# Patient Record
Sex: MALE | Race: BLACK OR AFRICAN AMERICAN
[De-identification: names, ages, dates, MRNs, and addresses within clinical notes are randomized per-mention and may not be internally consistent; named-entity substitution may affect disease eponyms.]

---

## 2022-11-17 ENCOUNTER — DASHBOARD ENCOUNTERS (OUTPATIENT)
Age: 72
End: 2022-11-17

## 2022-11-17 ENCOUNTER — OFFICE VISIT (OUTPATIENT)
Dept: URBAN - METROPOLITAN AREA CLINIC 17 | Facility: CLINIC | Age: 72
End: 2022-11-17
Payer: COMMERCIAL

## 2022-11-17 VITALS
DIASTOLIC BLOOD PRESSURE: 86 MMHG | BODY MASS INDEX: 26.18 KG/M2 | SYSTOLIC BLOOD PRESSURE: 149 MMHG | WEIGHT: 204 LBS | TEMPERATURE: 97.9 F | HEART RATE: 73 BPM | HEIGHT: 74 IN

## 2022-11-17 DIAGNOSIS — K57.30 COLON, DIVERTICULOSIS: ICD-10-CM

## 2022-11-17 DIAGNOSIS — I71.21 ANEURYSM OF ASCENDING AORTA WITHOUT RUPTURE: ICD-10-CM

## 2022-11-17 DIAGNOSIS — I71.40 ABDOMINAL AORTIC ANEURYSM (AAA) WITHOUT RUPTURE, UNSPECIFIED PART: ICD-10-CM

## 2022-11-17 DIAGNOSIS — D17.5 LIPOMA OF COLON: ICD-10-CM

## 2022-11-17 PROBLEM — 55827005: Status: ACTIVE | Noted: 2022-11-17

## 2022-11-17 PROBLEM — 266433003: Status: ACTIVE | Noted: 2022-11-17

## 2022-11-17 PROBLEM — 733657002: Status: ACTIVE | Noted: 2022-11-17

## 2022-11-17 PROBLEM — 89538001: Status: ACTIVE | Noted: 2022-11-17

## 2022-11-17 PROCEDURE — 99204 OFFICE O/P NEW MOD 45 MIN: CPT | Performed by: INTERNAL MEDICINE

## 2022-11-17 RX ORDER — METOPROLOL SUCCINATE 50 MG/1
TAKE 1 TABLET BY MOUTH EVERY DAY TABLET, EXTENDED RELEASE ORAL
Qty: 90 EACH | Refills: 0 | Status: ACTIVE | COMMUNITY

## 2022-11-17 NOTE — HPI-TODAY'S VISIT:
11/17/22 71 yo male pt of dr Leydi Steele.  He also sees Dr Gus Ambrosio I did his colonoscopy in 2016 - reviewed results. Says BMs are every 3-4 days - says it is not new "it goes and comes " No blood in stool No fhx of colon CA Reviewed notes from Dr Ambrosio.